# Patient Record
Sex: FEMALE | Race: WHITE | URBAN - METROPOLITAN AREA
[De-identification: names, ages, dates, MRNs, and addresses within clinical notes are randomized per-mention and may not be internally consistent; named-entity substitution may affect disease eponyms.]

---

## 2019-03-18 ENCOUNTER — APPOINTMENT (OUTPATIENT)
Age: 59
Setting detail: DERMATOLOGY
End: 2019-03-19

## 2019-03-18 VITALS — HEIGHT: 66 IN

## 2019-03-18 DIAGNOSIS — L71.8 OTHER ROSACEA: ICD-10-CM

## 2019-03-18 DIAGNOSIS — R23.3 SPONTANEOUS ECCHYMOSES: ICD-10-CM

## 2019-03-18 PROCEDURE — 99202 OFFICE O/P NEW SF 15 MIN: CPT

## 2019-03-18 PROCEDURE — OTHER COUNSELING: OTHER

## 2019-03-18 PROCEDURE — OTHER MIPS QUALITY: OTHER

## 2019-03-18 PROCEDURE — OTHER TREATMENT REGIMEN: OTHER

## 2019-03-18 PROCEDURE — OTHER PRESCRIPTION: OTHER

## 2019-03-18 PROCEDURE — OTHER FOLLOW UP FOR NEXT VISIT: OTHER

## 2019-03-18 RX ORDER — IVERMECTIN 10 MG/G
APPLY THIN COAT CREAM TOPICAL QHS
Qty: 1 | Refills: 2 | Status: ERX | COMMUNITY
Start: 2019-03-18

## 2019-03-18 ASSESSMENT — LOCATION SIMPLE DESCRIPTION DERM
LOCATION SIMPLE: LEFT CHEEK
LOCATION SIMPLE: CHIN
LOCATION SIMPLE: SUPERIOR FOREHEAD
LOCATION SIMPLE: RIGHT HAND
LOCATION SIMPLE: RIGHT CHEEK
LOCATION SIMPLE: LEFT HAND
LOCATION SIMPLE: LEFT FOREHEAD
LOCATION SIMPLE: RIGHT FOREHEAD
LOCATION SIMPLE: NOSE
LOCATION SIMPLE: LEFT FOREARM
LOCATION SIMPLE: RIGHT FOREARM

## 2019-03-18 ASSESSMENT — BSA RASH: BSA RASH: 1

## 2019-03-18 ASSESSMENT — SEVERITY ASSESSMENT: SEVERITY: MILD

## 2019-03-18 ASSESSMENT — LOCATION DETAILED DESCRIPTION DERM
LOCATION DETAILED: LEFT MEDIAL MALAR CHEEK
LOCATION DETAILED: RIGHT FOREHEAD
LOCATION DETAILED: LEFT DISTAL DORSAL FOREARM
LOCATION DETAILED: SUPERIOR MID FOREHEAD
LOCATION DETAILED: RIGHT DISTAL DORSAL FOREARM
LOCATION DETAILED: RIGHT RADIAL DORSAL HAND
LOCATION DETAILED: NASAL DORSUM
LOCATION DETAILED: RIGHT CHIN
LOCATION DETAILED: LEFT FOREHEAD
LOCATION DETAILED: RIGHT MEDIAL MALAR CHEEK
LOCATION DETAILED: LEFT RADIAL DORSAL HAND

## 2019-03-18 ASSESSMENT — LOCATION ZONE DERM
LOCATION ZONE: ARM
LOCATION ZONE: FACE
LOCATION ZONE: HAND
LOCATION ZONE: NOSE

## 2019-03-18 ASSESSMENT — SEVERITY ASSESSMENT OVERALL AMONG ALL PATIENTS
IN YOUR EXPERIENCE, AMONG ALL PATIENTS YOU HAVE SEEN WITH THIS CONDITION, HOW SEVERE IS THIS PATIENT'S CONDITION?: MODERATE

## 2019-03-18 NOTE — HPI: BUMPS
How Severe Are Your Bumps?: moderate
Have Your Bumps Been Treated?: not been treated
Is This A New Presentation, Or A Follow-Up?: Bumps
Additional History: Patient with recurrent and persistent red, slightly irritated, slightly painful bumps on the face. She also has associated redness. Sometime she will actually have pimples. There are no particular alleviating or aggravating factors however, patient did take an unknown antibiotic previously for 10 days that led to near total resolution

## 2019-03-18 NOTE — PROCEDURE: TREATMENT REGIMEN
Initiate Treatment: Soolantra 1% cream
Samples Given: Soolantra 1% cream x2
Plan: Patient reassured these are benign, no treatment needed. This is likely a result of her recent activity pulling up a fence
Detail Level: Zone
Detail Level: Simple
Plan: This appears consistent with rosacea. Patient will try the samples for a few days and if tolerated she will go and  the prescription. We also briefly discussed the possibility of doxycycline but we are going to hold off on the oral medication for now. Follow-up in two months

## 2019-03-20 ENCOUNTER — RX ONLY (RX ONLY)
Age: 59
End: 2019-03-20

## 2019-03-20 RX ORDER — DOXYCYCLINE HYCLATE 100 MG/1
100 MG CAPSULE, GELATIN COATED ORAL BID
Qty: 60 | Refills: 0 | Status: ERX | COMMUNITY
Start: 2019-03-20

## 2023-10-12 ENCOUNTER — APPOINTMENT (OUTPATIENT)
Dept: URBAN - METROPOLITAN AREA CLINIC 298 | Age: 63
Setting detail: DERMATOLOGY
End: 2023-10-12

## 2023-10-12 VITALS — RESPIRATION RATE: 18 BRPM | HEIGHT: 55 IN | WEIGHT: 145 LBS

## 2023-10-12 DIAGNOSIS — L82.0 INFLAMED SEBORRHEIC KERATOSIS: ICD-10-CM

## 2023-10-12 DIAGNOSIS — L71.8 OTHER ROSACEA: ICD-10-CM

## 2023-10-12 DIAGNOSIS — L57.0 ACTINIC KERATOSIS: ICD-10-CM

## 2023-10-12 PROCEDURE — 17110 DESTRUCT B9 LESION 1-14: CPT

## 2023-10-12 PROCEDURE — OTHER PRESCRIPTION MEDICATION MANAGEMENT: OTHER

## 2023-10-12 PROCEDURE — OTHER PRESCRIPTION: OTHER

## 2023-10-12 PROCEDURE — 99203 OFFICE O/P NEW LOW 30 MIN: CPT | Mod: 25

## 2023-10-12 PROCEDURE — OTHER COUNSELING: OTHER

## 2023-10-12 PROCEDURE — OTHER LIQUID NITROGEN: OTHER

## 2023-10-12 PROCEDURE — 17000 DESTRUCT PREMALG LESION: CPT | Mod: 59

## 2023-10-12 PROCEDURE — OTHER MIPS QUALITY: OTHER

## 2023-10-12 PROCEDURE — OTHER BENIGN DESTRUCTION: OTHER

## 2023-10-12 RX ORDER — IVERMECTIN 10 MG/G
CREAM TOPICAL ONCE A DAY
Qty: 45 | Refills: 5 | Status: ERX | COMMUNITY
Start: 2023-10-12

## 2023-10-12 ASSESSMENT — LOCATION ZONE DERM
LOCATION ZONE: NOSE
LOCATION ZONE: FACE
LOCATION ZONE: TRUNK

## 2023-10-12 ASSESSMENT — LOCATION SIMPLE DESCRIPTION DERM
LOCATION SIMPLE: RIGHT UPPER BACK
LOCATION SIMPLE: RIGHT CHEEK
LOCATION SIMPLE: RIGHT NOSE
LOCATION SIMPLE: LEFT CHEEK

## 2023-10-12 ASSESSMENT — LOCATION DETAILED DESCRIPTION DERM
LOCATION DETAILED: LEFT INFERIOR CENTRAL MALAR CHEEK
LOCATION DETAILED: RIGHT MEDIAL UPPER BACK
LOCATION DETAILED: RIGHT INFERIOR CENTRAL MALAR CHEEK
LOCATION DETAILED: RIGHT NASAL SIDEWALL

## 2023-10-12 NOTE — PROCEDURE: PRESCRIPTION MEDICATION MANAGEMENT
Render In Strict Bullet Format?: No
Initiate Treatment: Soolantra 1 % topical cream Once a day\\nQuantity: 45.0 g  Days Supply: 90\\nSig: Apply a pea-sized amount to face once every morning
Detail Level: Zone
Plan: Return to clinic in 6 weeks.

## 2023-10-12 NOTE — PROCEDURE: BENIGN DESTRUCTION
Post-Care Instructions: I reviewed with the patient in detail post-care instructions. Patient is to wear sunprotection, and avoid picking at any of the treated lesions. Pt may apply Vaseline to crusted or scabbing areas.
Treatment Number (Will Not Render If 0): 0
Render Post-Care Instructions In Note?: no
Detail Level: Simple
Anesthesia Volume In Cc: 0.5
Medical Necessity Information: It is in your best interest to select a reason for this procedure from the list below. All of these items fulfill various CMS LCD requirements except the new and changing color options.
Consent: The patient's consent was obtained
Medical Necessity Clause: This procedure was medically necessary because the lesions that were treated were:

## 2023-10-12 NOTE — PROCEDURE: LIQUID NITROGEN
Show Applicator Variable?: Yes
Render Post-Care Instructions In Note?: no
Consent: The patient's consent was obtained
Detail Level: Simple
Duration Of Freeze Thaw-Cycle (Seconds): 0
Post-Care Instructions: I reviewed with the patient in detail post-care instructions. Patient is to wear sunprotection, and avoid picking at any of the treated lesions. Pt may apply Vaseline to crusted or scabbing areas.